# Patient Record
Sex: FEMALE | Race: WHITE
[De-identification: names, ages, dates, MRNs, and addresses within clinical notes are randomized per-mention and may not be internally consistent; named-entity substitution may affect disease eponyms.]

---

## 2020-02-28 ENCOUNTER — HOSPITAL ENCOUNTER (OUTPATIENT)
Dept: HOSPITAL 47 - WWCWWP | Age: 50
End: 2020-02-28
Attending: SURGERY
Payer: COMMERCIAL

## 2020-02-28 VITALS
SYSTOLIC BLOOD PRESSURE: 126 MMHG | TEMPERATURE: 98 F | DIASTOLIC BLOOD PRESSURE: 83 MMHG | RESPIRATION RATE: 16 BRPM | HEART RATE: 65 BPM

## 2020-02-28 DIAGNOSIS — Z53.9: Primary | ICD-10-CM

## 2020-02-28 NOTE — P.GSHP
History of Present Illness


H&P Date: 20


Chief Complaint: Abnormal right breast mammogram





Flores is a 50-year-old white female who had an initial mammogram performed in 

2019 and is seen in consultation for Casi Vaz, and Dr. Dailey 

regarding an abnormality in the right breast.  Following this additional views 

of the right breast were recommended.  On the right breast is noted to have 

persistent heterogeneous calcifications in the middle to posterior depth upper 

aspect.  This was seen into lateral slice 35.  The patient does not feel any 

lumps masses or nodules in her breast.  She is not complaining of any nipple 

discharge or skin changes.  She has not had any recent trauma or infection in 

the breast.  Of concern is the fact that she has a sister diagnosed with breast 

cancer near the age of 50.





Family History:


sister: breast cancer


patient: cervical cancer, no chemo or radiation


father: colon cancer


niece: cervical cancer








Hormonal History;


menarche: 15


, breast fed:no, age at first: 18


menopause: going through that now, periods stopped over a year ago


BCP: none


hormones: none








Surgical history:


Pins in the left elbow


Tubal ligation


Right wrist surgery


stints placed on aspirin and plavix











Medical History:


MI September/stints placed 





Social History:


smoke: stopped in Sept.,  PPD for 27 years


alcohol: none


drugs: none 











- Constitutional


Constitutional: Reports sweats





- EENT


Eyes: denies blurred vision, denies pain


Ears: deny: decreased hearing, tinnitus


Ears, nose, mouth and throat: Denies headache, Denies sore throat





- Breasts


Breasts: bilateral: as per HPI





- Cardiovascular


Comment: 





MI





- Respiratory


Comment: 





former smoker





- Gastrointestinal


Comment: 





peptic ulcer disease





- Genitourinary (Female)


Genitourinary: Reports kidney stones, Denies dysuria, Denies hematuria





- Menstruation


Menstruation: Reports postmenopausal





- Musculoskeletal


Comment: 





arthritis in hands





- Integumentary


Integumentary: Denies pruritus, Denies rash





- Neurological


Neurological: Denies numbness, Denies weakness





- Psychiatric


Psychiatric: Denies anxiety, Denies depression





- Endocrine


Endocrine: Denies fatigue, Denies weight change





- Hematologic/Lymphatic


Comment: 





aspirin and plavix/ cardiologist Dr. Whitten, can stop one or the other for two 

days 





- Allergic/Immunologic


Allergic/Immunologic: Reports seasonal allergies





Past Medical History


Smoking Status: Former smoker





Medications and Allergies


                                Home Medications











 Medication  Instructions  Recorded  Confirmed  Type


 


Aspirin [Adult Low Dose Aspirin EC] 81 mg PO QAM 20 History


 


Atorvastatin [Lipitor] 80 mg PO HS 20 History


 


Carvedilol [Coreg] 3.125 mg PO BID 20 History


 


Clopidogrel [Plavix] 75 mg PO QAM 20 History


 


Famotidine [Pepcid] 20 mg PO QAM 20 History


 


Ferrous Sulfate [Iron] 325 mg PO QAM 20 History


 


Gabapentin [Neurontin] 100 mg PO BID 20 History


 


Lisinopril [Zestril] 5 mg PO HS 20 History


 


metFORMIN  mg PO HS 20 History








                                    Allergies











Allergy/AdvReac Type Severity Reaction Status Date / Time


 


sulfamethoxazole Allergy  Rash/Hives Unverified 20 13:10





[From Bactrim]     


 


trimethoprim [From Bactrim] Allergy  Rash/Hives Unverified 20 13:10














Surgical - Exam





BMI 22.2





- General


well developed, well nourished, no distress





- Eyes


normal ocular movement





- ENT


normal pinna, normal nares, no hearing loss, no congestion





- Neck


no masses, trachea midline





- Respiratory


normal expansion, normal respiratory effort, clear to auscultation





- Cardiovascular


Rhythm: regular


Heart Sounds: normal: S1, S2





- Abdomen


Abdomen: soft, non tender, bowel sounds, no guarding, no rigid, no rebound





- Integumentary





normal turgor





- Neurologic


no disoriented, no combative





- Musculoskeletal


normal gait





- Psychiatric


oriented to time, oriented to person, oriented to place, speech is normal, 

memory intact





breast exam:


BRA 34 C


Ptosis grade 2





Inspection: Right breast slightly larger than the left breast with the nipple 

areolar complex slightly lower, no skin lesions of concern





Palpation:


Right breast: Fibrocystic changes  on Multi-positional exam no dominant masses 

or nodules of concern


Right axilla: No adenopathy of concern


Left breast: Multiple positional exam fibrocystic changes no dominant mass or 

nodule is of concern


Left axilla: No adenopathy of concern





Results





mammogram reviewed with Dr. Arnold from radiology, area is suspicious enough that 

she believes it does warrant biopsy despite the fact patient is had a recent 

myocardial infarction, patient was told from cardiology she could stop aspirin 

or Plavix for 2 days before but not both





Assessment and Plan


Assessment: 





Impression: 


1.  Mammographic normality right breast


2.  Bilateral fibrocystic breast changes


3.  Family history of breast cancer


4.  Recent myocardial infarction on aspirin and Plavix


5.  Former smoker





Plan:


1.  Stereotactic core biopsy right breast


2.  Patient is going to stop her Plavix for 2 days prior to the procedure








Risks and benefits of stereotactic core biopsy discussed with the patient and 

her .  They understand and understand that she is at increased risk for 

hematoma secondary to the fact she will continue on her aspirin.  Risk include 

but are not limited to bleeding, infection, reaction to the anesthetic.  There 

is a risk that the area of concern may not be sampled and the patient may 

require further intervention.  They understand and wish to proceed.





Clearance from Dr. Whitten (cardiology) reviewed she notes that the patient 

underwent placement of a proximal LAD stent in 2019.  That the ACC/AHA

guideline recommendation is dual antiplatelet therapy for minimum of 1 year.  

The patient however has a sufficiently suspicious lesion in her right breast 

that biopsy is warranted.  She will continue the aspirin and stop the Plavix for

approximately 2 days prior to the procedure.  She will restart the plavix again 

the first postoperative day.





CC: Dr. Asim Urias





encounter 45 minutes, > 50% of time in planning and counselling

## 2020-06-02 ENCOUNTER — HOSPITAL ENCOUNTER (OUTPATIENT)
Dept: HOSPITAL 47 - LABWHC1 | Age: 50
Discharge: HOME | End: 2020-06-02
Attending: FAMILY MEDICINE
Payer: COMMERCIAL

## 2020-06-02 DIAGNOSIS — Z11.59: Primary | ICD-10-CM

## 2020-06-04 ENCOUNTER — HOSPITAL ENCOUNTER (OUTPATIENT)
Dept: HOSPITAL 47 - RADMAMWWP | Age: 50
End: 2020-06-04
Attending: SURGERY
Payer: COMMERCIAL

## 2020-06-04 VITALS — SYSTOLIC BLOOD PRESSURE: 119 MMHG | DIASTOLIC BLOOD PRESSURE: 73 MMHG | HEART RATE: 69 BPM

## 2020-06-04 VITALS — RESPIRATION RATE: 16 BRPM | TEMPERATURE: 98.4 F

## 2020-06-04 DIAGNOSIS — N60.21: ICD-10-CM

## 2020-06-04 DIAGNOSIS — N60.11: Primary | ICD-10-CM

## 2020-06-04 PROCEDURE — 19081 BX BREAST 1ST LESION STRTCTC: CPT

## 2020-06-04 PROCEDURE — 88305 TISSUE EXAM BY PATHOLOGIST: CPT

## 2020-06-04 NOTE — P.PCN
Date of Procedure: 06/04/20


Preoperative Diagnosis: 


Mammographic abnormality right breast


Postoperative Diagnosis: 


Same


Procedure(s) Performed: 


Right breast stereotactic core biopsy


Anesthesia: local


Surgeon: Demetria Cooney


Estimated Blood Loss (ml): 0.2


Pathology: other (Breast tissue)


Condition: stable


Disposition: same day


Indications for Procedure: 


Heterogeneous calcifications in the right breast


Operative Findings: 


Dense breast tissue/microcalcifications noted in biopsy specimen


Description of Procedure: 


     Flores is a 50-year-old white female who was noted to have heterogeneous 

calcifications middle to posterior depth upper aspect of the right breast.  She 

was recommended to undergo a stereotactic core biopsy.  The patient had 2 groups

of calcifications which were being followed.  The more suspicious group was a 

linear group.  This was reviewed with Dr. Mercado from radiology.  The patient has

been on anticoagulation and continue his baby aspirin as well as stopping Plavix

for only 2 days preprocedure.  She wishes to undergo stereo biopsy.  Risk and 

benefits of the procedure including hematoma were discussed with the patient.  

Alternatives including watchful waiting or from biopsy were not recommended.


The patient was taken to the stereotactic core biopsy unit.  The patient was 

positioned on the stereotactic table.  A  film was obtained.  The area of 

concern was identified.  The microcalcifications were very faint.  A medial to 

lateral approach was utilized.  The area of concern was localized using a stereo

pair.  The breast was prepped using Betadine.  25 mL of 1% lidocaine 20th which 

had epinephrine were used to anesthetize the area of concern.  A 9-gauge vacuum-

assisted core rotating biopsy needle was driven to the correct coordinates.  The

needle was fired.  Post fire films revealed the needle to be in the correct 

location.  8 biopsy specimens were obtained.  Radiograph of the specimen 

revealed that there were microcalcifications present.


     A secure chip top Marker was placed.  The patient tolerated the procedure 

in stable condition.  There was concern that the area of greatest suspicion was 

adequately sampled.  A postprocedure radiograph is been obtained.  The second 

area of calcifications was very faintly could not be well seen, therefore 

further recommendation will be made based on pathology from this area.


The specimen was sent to pathology.  The patient will follow-up Dr. Miranda next 

week.

## 2020-06-04 NOTE — MM
EXAMINATION TYPE: MG stereo VAD BX RT

 

DATE OF EXAM: 6/4/2020

 

COMPARISON: Outside diagnostic right mammogram dated 12/5/2019

 

CLINICAL HISTORY: Indeterminate right breast calcifications for which 
stereotactic guided biopsy was recommended.

 

TECHNIQUE: Stereotactic guided core biopsy of right breast.  

 

FINDINGS: Prior to the procedure right exaggerated CC, right LM, spot 
identification LM and spot magnification CC views were performed as imaging from
the outside institution of 12/5/2019 appeared incomplete. The procedure of 
stereotactic guided core biopsy was explained to the patient.  Benefits, 
alternatives, and risks were discussed.  An informed consent was then obtained. 
Preprocedural timeout was performed.

 

The pathway in which the calcifications were best visualized was chosen for 
biopsy, which was a medial to lateral approach. I performed the localization 
with the surgeon, then surgeon, Dr. Ruben Snell performed the remainder of the 
procedure. It was noted the calcifications were extremely difficult to visualize
despite numerous attempts. A vacuum assisted biopsy gun was used to obtain 
multiple core samples.   

 

The patient tolerated the procedure well without any immediate complication.  
The patient was kept in the radiology department for short stay after the 
procedure and then discharged home in stable condition. Definitive 
calcifications are identified in specimen mammogram.  Post biopsy mammogram 
shows the clip to appear in the upper outer quadrant rather than the expected 
location of the upper inner quadrant.  

 

IMPRESSION: 

SUCCESSFUL, UNCOMPLICATED STEREOTACTIC GUIDED CORE BIOPSY OF CALCIFICATIONS IN 
THE UPPER OUTER QUADRANT OF THE RIGHT BREAST LOCATED IN THE SAMPLE, HOWEVER THE 
TARGETED UPPER INNER QUADRANT CALCIFICATIONS WERE NOT SAMPLED AS THEY WERE 
DIFFICULT TO VISUALIZE AND LOCALIZED DESPITE MULTIPLE ATTEMPTS, FULL PATHOLOGY 
RESULTS TO FOLLOW.  

 

Pathology Results: Benign



RIGHT BREAST, STEREOTACTIC CORE BIOPSY:  Fibrocystic changes including fibrosis,
small cysts, adenosis and calcifications.   



Recommendation 

Follow up mammogram of the right breast in 6 months.

Surgical consult of the right breast.

Continued follow up of upper inner quadrant calcifcations that were unable to be
sampled.

[facility]

## 2020-06-12 ENCOUNTER — HOSPITAL ENCOUNTER (OUTPATIENT)
Dept: HOSPITAL 47 - WWCWWP | Age: 50
Discharge: HOME | End: 2020-06-12
Attending: SURGERY
Payer: COMMERCIAL

## 2020-06-12 VITALS
HEART RATE: 63 BPM | TEMPERATURE: 98.2 F | DIASTOLIC BLOOD PRESSURE: 78 MMHG | SYSTOLIC BLOOD PRESSURE: 128 MMHG | RESPIRATION RATE: 18 BRPM

## 2020-06-12 DIAGNOSIS — Z53.9: Primary | ICD-10-CM

## 2020-06-12 NOTE — P.PN
Subjective


Progress Note Date: 06/12/20


Principal diagnosis: 





Status post air ejected biopsy of the right breast





Flores is a 50-year-old white female status post her tactic core biopsy of an 

area of concern in the right breast.  Postprocedure the marking clip was 

reviewed with Dr. Mercado from radiology and it was not felt that the clip was in 

the correct location.  This is been discussed with the patient and her . 

The pathology revealed fibrocystic changes including fibrosis small cyst 

adenosis and calcifications.  The discussion with radiology was that if this was

cancer the other areas would need to be biopsied.  If this was benign a repeat 

mammogram in 6 months would be recommended as the area of calcification we were 

initially going flow was not well seen.  The patient and her  understand 

this may talked about the possibility of trying to do a needle localization 

resection the operating room and they wish to wait for 6 months.  They 

understand that I cannot guarantee there is not something very small percolating

than the breast which would require biopsy in the future however the wish to 

wait for 6 months for a repeat mammogram.





Objective





- Vital Signs


Vital signs: 


                                   Vital Signs











Temp  98.2 F   06/12/20 11:16


 


Pulse  63   06/12/20 11:16


 


Resp  18   06/12/20 11:16


 


BP  128/78   06/12/20 11:16


 


Pulse Ox  97   06/12/20 11:16








                                 Intake & Output











 06/11/20 06/12/20 06/12/20





 18:59 06:59 18:59


 


Weight   72.121 kg














- Exam





BMI 24.9





- Constitutional


General appearance: Present: average body habitus





- EENT


Eyes: Present: EOMI


ENT: Present: hearing grossly normal





- Respiratory


Respiratory: bilateral: CTA





- Cardiovascular


Rhythm: regular


Heart sounds: normal: S1, S2





- Integumentary


Integumentary Comment(s): 





Biopsy site right breast clean and dry no evidence of ecchymosis or hematoma





- Musculoskeletal


Musculoskeletal: Present: gait normal





- Psychiatric


Psychiatric: Present: A&O x's 3, appropriate affect, intact judgment & insight





Assessment and Plan


Assessment: 





Impression:


1.  Stereotactic core biopsy right breast performed on 6420 benign with 

microcalcifications, however this is not felt to be the area of greatest concern

in the breast that area was extremely difficult to visualize.  After review with

radiology and discussion with the patient and her  they have opted for 

repeat right breast mammogram in 6 months with close surveillance.  Attempted to

needle localization and excision the operating room was discussed however at 

this time the patient would like to wait for the 6 months.





Plan:


1.  Repeat right breast mammogram in 6 months with physician exam at that time


2.  Patient is anything of concern primarily would see her sooner





CC: Casi Vaz (Kathleen)





encounter 15 minutes greater than 50% of time in planning and counselling

## 2020-07-10 ENCOUNTER — HOSPITAL ENCOUNTER (OUTPATIENT)
Dept: HOSPITAL 47 - RADXRMAIN | Age: 50
Discharge: HOME | End: 2020-07-10
Attending: UROLOGY
Payer: COMMERCIAL

## 2020-07-10 DIAGNOSIS — N20.1: ICD-10-CM

## 2020-07-10 DIAGNOSIS — N20.0: Primary | ICD-10-CM

## 2020-07-10 PROCEDURE — 74018 RADEX ABDOMEN 1 VIEW: CPT

## 2020-07-10 NOTE — XR
EXAMINATION TYPE: XR abdomen 1V

 

DATE OF EXAM: 7/10/2020

 

HISTORY:  Pain

 

Comparison: None.Single KUB is submitted for interpretation.

 

Findings:

Right renal calculi: None Visualized.

 

Right ureteral calculi: 3mm calculus right UVJ  

 

Left renal calculi:  None Visualized.

 

Left ureteral calculi:  None Visualized.

 

Pelvic calcifications:  None Visualized.

 

Bowel gas pattern is unremarkable.  No free air.  No mass effects.

 

IMPRESSION:

 

1. 3mm calculus right UVJ

## 2020-07-23 ENCOUNTER — HOSPITAL ENCOUNTER (OUTPATIENT)
Dept: HOSPITAL 47 - RADXRMAIN | Age: 50
Discharge: HOME | End: 2020-07-23
Attending: UROLOGY
Payer: COMMERCIAL

## 2020-07-23 DIAGNOSIS — N20.1: ICD-10-CM

## 2020-07-23 DIAGNOSIS — N20.0: Primary | ICD-10-CM

## 2020-07-23 PROCEDURE — 74018 RADEX ABDOMEN 1 VIEW: CPT

## 2020-07-23 PROCEDURE — 82365 CALCULUS SPECTROSCOPY: CPT

## 2020-07-23 NOTE — XR
EXAMINATION TYPE: XR abdomen 1V

 

DATE OF EXAM: 7/23/2020

 

COMPARISON: 7/10/2020

 

HISTORY: Pain

 

TECHNIQUE: One view abdominal series

 

FINDINGS:  

The osseous structures are intact.  The bowel gas pattern is nonspecific. Retained fecal debris throu
ghout the colon. Marked hypertrophy of the acetabulum bilaterally with findings suggestive of femoral
 acetabular impingement. Osteitis pubis condensans. There remains a tiny 2.7 mm calcification the rig
ht hemipelvis.

Renal outlines are obscured by bowel content. Sclerotic density overlying the left iliac bone may rep
resent a small bone island.

 

IMPRESSION:  

1.  Nonspecific abdomen. There remains a small 2 mm calcification in the right hemipelvis possibly wi
thin the distal right UVJ.

## 2020-12-28 ENCOUNTER — HOSPITAL ENCOUNTER (OUTPATIENT)
Dept: HOSPITAL 47 - RADMAMWWP | Age: 50
Discharge: HOME | End: 2020-12-28
Attending: SURGERY
Payer: COMMERCIAL

## 2020-12-28 DIAGNOSIS — R92.8: Primary | ICD-10-CM

## 2020-12-28 PROCEDURE — 77065 DX MAMMO INCL CAD UNI: CPT

## 2020-12-29 NOTE — MM
Reason for exam: follow-up at short interval from prior study.

Last mammogram was performed 7 months ago.



History:

Patient is postmenopausal.

Benign MG stereo VAD BX RT of the right breast, June 4, 2020.



Physical Findings:

Nurse did not find any significant physical abnormalities on exam.



MG Diagnostic Mammo RT w CAD

CC and MLO view(s) were taken of the right breast.

Prior study comparison: June 4, 2020, mammogram.  November 14, 2019, mammogram.

The breast tissue is heterogeneously dense. This may lower the sensitivity of 

mammography.  There are benign appearing round calcifications in the right breast.

There is no discrete abnormality.



These results were verbally communicated with the patient and result sheet given 

to the patient on 12/28/20.





ASSESSMENT: Benign, BI-RAD 2



RECOMMENDATION:

Routine screening mammogram of both breasts in 1 year.